# Patient Record
Sex: FEMALE | Race: WHITE
[De-identification: names, ages, dates, MRNs, and addresses within clinical notes are randomized per-mention and may not be internally consistent; named-entity substitution may affect disease eponyms.]

---

## 2020-01-16 ENCOUNTER — HOSPITAL ENCOUNTER (OUTPATIENT)
Dept: HOSPITAL 4 - SUS | Age: 65
Discharge: HOME | End: 2020-01-16
Attending: SPECIALIST
Payer: COMMERCIAL

## 2020-01-16 DIAGNOSIS — E04.2: Primary | ICD-10-CM

## 2020-11-05 ENCOUNTER — HOSPITAL ENCOUNTER (OUTPATIENT)
Dept: HOSPITAL 26 - MLB | Age: 65
Discharge: HOME | End: 2020-11-05
Payer: COMMERCIAL

## 2020-11-05 DIAGNOSIS — I10: ICD-10-CM

## 2020-11-05 DIAGNOSIS — E66.01: ICD-10-CM

## 2020-11-05 DIAGNOSIS — E11.9: Primary | ICD-10-CM

## 2020-11-05 DIAGNOSIS — D50.0: ICD-10-CM

## 2020-11-05 DIAGNOSIS — E78.2: ICD-10-CM

## 2020-11-05 LAB
ALBUMIN FLD-MCNC: 3.4 G/DL (ref 3.4–5)
ANION GAP SERPL CALCULATED.3IONS-SCNC: 16.5 MMOL/L (ref 8–16)
AST SERPL-CCNC: 19 U/L (ref 15–37)
BASOPHILS # BLD AUTO: 0.1 K/UL (ref 0–0.22)
BASOPHILS NFR BLD AUTO: 0.7 % (ref 0–2)
BILIRUB SERPL-MCNC: 0.4 MG/DL (ref 0–1)
BUN SERPL-MCNC: 18 MG/DL (ref 7–18)
CHLORIDE SERPL-SCNC: 103 MMOL/L (ref 98–107)
CHOLEST/HDLC SERPL: 2.8 {RATIO} (ref 1–4.5)
CO2 SERPL-SCNC: 26 MMOL/L (ref 21–32)
CREAT SERPL-MCNC: 0.9 MG/DL (ref 0.6–1.3)
EOSINOPHIL # BLD AUTO: 0.3 K/UL (ref 0–0.4)
EOSINOPHIL NFR BLD AUTO: 3.9 % (ref 0–4)
ERYTHROCYTE [DISTWIDTH] IN BLOOD BY AUTOMATED COUNT: 15.8 % (ref 11.6–13.7)
GFR SERPL CREATININE-BSD FRML MDRD: 81 ML/MIN (ref 90–?)
GLUCOSE SERPL-MCNC: 123 MG/DL (ref 74–106)
HCT VFR BLD AUTO: 30.1 % (ref 36–48)
HDLC SERPL-MCNC: 53 MG/DL (ref 40–60)
HGB BLD-MCNC: 9.9 G/DL (ref 12–16)
IRON SERPL-MCNC: 23 UG/DL (ref 50–175)
LDLC SERPL CALC-MCNC: 76 MG/DL (ref 60–100)
LYMPHOCYTES # BLD AUTO: 1.1 K/UL (ref 2.5–16.5)
LYMPHOCYTES NFR BLD AUTO: 14.3 % (ref 20.5–51.1)
MCH RBC QN AUTO: 29 PG (ref 27–31)
MCHC RBC AUTO-ENTMCNC: 33 G/DL (ref 33–37)
MCV RBC AUTO: 86.5 FL (ref 80–94)
MONOCYTES # BLD AUTO: 0.5 K/UL (ref 0.8–1)
MONOCYTES NFR BLD AUTO: 6.7 % (ref 1.7–9.3)
NEUTROPHILS # BLD AUTO: 5.9 K/UL (ref 1.8–7.7)
NEUTROPHILS NFR BLD AUTO: 74.4 % (ref 42.2–75.2)
PLATELET # BLD AUTO: 354 K/UL (ref 140–450)
POTASSIUM SERPL-SCNC: 4.5 MMOL/L (ref 3.5–5.1)
RBC # BLD AUTO: 3.48 MIL/UL (ref 4.2–5.4)
SODIUM SERPL-SCNC: 141 MMOL/L (ref 136–145)
TIBC SERPL-MCNC: 442 UG/DL (ref 250–450)
TRIGL SERPL-MCNC: 97 MG/DL (ref 30–150)
TSH SERPL DL<=0.05 MIU/L-ACNC: 1.69 UIU/ML (ref 0.34–3.74)
WBC # BLD AUTO: 7.9 K/UL (ref 4.8–10.8)

## 2020-11-06 LAB — T4 FREE SERPL-MCNC: 0.99 NG/DL (ref 0.82–1.77)

## 2020-12-03 ENCOUNTER — HOSPITAL ENCOUNTER (EMERGENCY)
Dept: HOSPITAL 26 - MED | Age: 65
Discharge: HOME | End: 2020-12-03
Payer: COMMERCIAL

## 2020-12-03 VITALS — SYSTOLIC BLOOD PRESSURE: 127 MMHG | DIASTOLIC BLOOD PRESSURE: 78 MMHG

## 2020-12-03 VITALS — BODY MASS INDEX: 36.44 KG/M2 | HEIGHT: 62 IN | WEIGHT: 198 LBS

## 2020-12-03 VITALS — SYSTOLIC BLOOD PRESSURE: 158 MMHG | DIASTOLIC BLOOD PRESSURE: 93 MMHG

## 2020-12-03 DIAGNOSIS — Z79.899: ICD-10-CM

## 2020-12-03 DIAGNOSIS — I51.89: ICD-10-CM

## 2020-12-03 DIAGNOSIS — Z98.890: ICD-10-CM

## 2020-12-03 DIAGNOSIS — I10: ICD-10-CM

## 2020-12-03 DIAGNOSIS — Z88.0: ICD-10-CM

## 2020-12-03 DIAGNOSIS — E07.9: ICD-10-CM

## 2020-12-03 DIAGNOSIS — J44.9: ICD-10-CM

## 2020-12-03 DIAGNOSIS — E11.9: ICD-10-CM

## 2020-12-03 DIAGNOSIS — N23: Primary | ICD-10-CM

## 2020-12-03 DIAGNOSIS — J45.909: ICD-10-CM

## 2020-12-03 LAB
ALBUMIN FLD-MCNC: 3.9 G/DL (ref 3.4–5)
ANION GAP SERPL CALCULATED.3IONS-SCNC: 14.4 MMOL/L (ref 8–16)
APPEARANCE UR: (no result)
AST SERPL-CCNC: 25 U/L (ref 15–37)
BASOPHILS # BLD AUTO: 0.1 K/UL (ref 0–0.22)
BASOPHILS NFR BLD AUTO: 1.3 % (ref 0–2)
BILIRUB SERPL-MCNC: 0.5 MG/DL (ref 0–1)
BILIRUB UR QL STRIP: NEGATIVE
BUN SERPL-MCNC: 19 MG/DL (ref 7–18)
CHLORIDE SERPL-SCNC: 104 MMOL/L (ref 98–107)
CO2 SERPL-SCNC: 28 MMOL/L (ref 21–32)
COLOR UR: YELLOW
CREAT SERPL-MCNC: 1.2 MG/DL (ref 0.6–1.3)
EOSINOPHIL # BLD AUTO: 0.3 K/UL (ref 0–0.4)
EOSINOPHIL NFR BLD AUTO: 3.1 % (ref 0–4)
ERYTHROCYTE [DISTWIDTH] IN BLOOD BY AUTOMATED COUNT: 15.5 % (ref 11.6–13.7)
GFR SERPL CREATININE-BSD FRML MDRD: 58 ML/MIN (ref 90–?)
GLUCOSE SERPL-MCNC: 115 MG/DL (ref 74–106)
GLUCOSE UR STRIP-MCNC: NEGATIVE MG/DL
HCT VFR BLD AUTO: 35.5 % (ref 36–48)
HGB BLD-MCNC: 11.3 G/DL (ref 12–16)
HGB UR QL STRIP: (no result)
LEUKOCYTE ESTERASE UR QL STRIP: (no result)
LIPASE SERPL-CCNC: 234 U/L (ref 73–393)
LYMPHOCYTES # BLD AUTO: 1.7 K/UL (ref 2.5–16.5)
LYMPHOCYTES NFR BLD AUTO: 15.9 % (ref 20.5–51.1)
MCH RBC QN AUTO: 28 PG (ref 27–31)
MCHC RBC AUTO-ENTMCNC: 32 G/DL (ref 33–37)
MCV RBC AUTO: 88.5 FL (ref 80–94)
MONOCYTES # BLD AUTO: 0.7 K/UL (ref 0.8–1)
MONOCYTES NFR BLD AUTO: 6.7 % (ref 1.7–9.3)
NEUTROPHILS # BLD AUTO: 8 K/UL (ref 1.8–7.7)
NEUTROPHILS NFR BLD AUTO: 73 % (ref 42.2–75.2)
NITRITE UR QL STRIP: NEGATIVE
PH UR STRIP: 5.5 [PH] (ref 5–9)
PLATELET # BLD AUTO: 394 K/UL (ref 140–450)
POTASSIUM SERPL-SCNC: 4.4 MMOL/L (ref 3.5–5.1)
RBC # BLD AUTO: 4.02 MIL/UL (ref 4.2–5.4)
RBC #/AREA URNS HPF: (no result) /HPF (ref 0–5)
SODIUM SERPL-SCNC: 142 MMOL/L (ref 136–145)
WBC # BLD AUTO: 10.9 K/UL (ref 4.8–10.8)
WBC,URINE: (no result) /HPF (ref 0–5)

## 2020-12-03 PROCEDURE — 85025 COMPLETE CBC W/AUTO DIFF WBC: CPT

## 2020-12-03 PROCEDURE — 83690 ASSAY OF LIPASE: CPT

## 2020-12-03 PROCEDURE — 96376 TX/PRO/DX INJ SAME DRUG ADON: CPT

## 2020-12-03 PROCEDURE — 99284 EMERGENCY DEPT VISIT MOD MDM: CPT

## 2020-12-03 PROCEDURE — 81001 URINALYSIS AUTO W/SCOPE: CPT

## 2020-12-03 PROCEDURE — 80053 COMPREHEN METABOLIC PANEL: CPT

## 2020-12-03 PROCEDURE — 74176 CT ABD & PELVIS W/O CONTRAST: CPT

## 2020-12-03 PROCEDURE — 87086 URINE CULTURE/COLONY COUNT: CPT

## 2020-12-03 PROCEDURE — 96361 HYDRATE IV INFUSION ADD-ON: CPT

## 2020-12-03 PROCEDURE — 96375 TX/PRO/DX INJ NEW DRUG ADDON: CPT

## 2020-12-03 PROCEDURE — 36415 COLL VENOUS BLD VENIPUNCTURE: CPT

## 2020-12-03 PROCEDURE — 96365 THER/PROPH/DIAG IV INF INIT: CPT

## 2020-12-03 NOTE — NUR
Pt c/o left flank pain that radiates to left lower abd and vaginal pressure 
since 0900 today. 



medhx: HTN, DM, hypothyroid, hiatal hernia, kidney stones

## 2020-12-03 NOTE — NUR
Patient discharged with v/s stable. Written and verbal after care instructions 
given and explained. 

Patient alert, oriented and verbalized understanding of instructions. 
Ambulatory with steady gait. All questions addressed prior to discharge. ID 
band removed. Patient advised to follow up with PMD. Rx of phenazopyridine 
hydrochloride 100mg tab TID PO, naprosyn 500mg tab BID PO PRN pain, 
ciprofloxacin hydrochloride 500mg tab BID PO, and zofran ODT 1 tab q8h PRN 
nausea given. Patient educated on indication of medication including possible 
reaction and side effects. Opportunity to ask questions provided and answered.

## 2021-03-05 ENCOUNTER — HOSPITAL ENCOUNTER (OUTPATIENT)
Dept: HOSPITAL 26 - MLB | Age: 66
Discharge: HOME | End: 2021-03-05
Attending: FAMILY MEDICINE
Payer: COMMERCIAL

## 2021-03-05 DIAGNOSIS — E78.2: ICD-10-CM

## 2021-03-05 DIAGNOSIS — E66.01: ICD-10-CM

## 2021-03-05 DIAGNOSIS — I10: Primary | ICD-10-CM

## 2021-03-05 DIAGNOSIS — E11.9: ICD-10-CM

## 2021-03-05 DIAGNOSIS — D50.0: ICD-10-CM

## 2021-03-05 LAB
ALBUMIN FLD-MCNC: 3.4 G/DL (ref 3.4–5)
ANION GAP SERPL CALCULATED.3IONS-SCNC: 16.1 MMOL/L (ref 8–16)
AST SERPL-CCNC: 17 U/L (ref 15–37)
BASOPHILS # BLD AUTO: 0.1 K/UL (ref 0–0.22)
BASOPHILS NFR BLD AUTO: 0.9 % (ref 0–2)
BILIRUB SERPL-MCNC: 0.4 MG/DL (ref 0–1)
BUN SERPL-MCNC: 15 MG/DL (ref 7–18)
CHLORIDE SERPL-SCNC: 104 MMOL/L (ref 98–107)
CHOLEST/HDLC SERPL: 2.9 {RATIO} (ref 1–4.5)
CO2 SERPL-SCNC: 24.4 MMOL/L (ref 21–32)
CREAT SERPL-MCNC: 0.8 MG/DL (ref 0.6–1.3)
EOSINOPHIL # BLD AUTO: 0.9 K/UL (ref 0–0.4)
EOSINOPHIL NFR BLD AUTO: 11.3 % (ref 0–4)
ERYTHROCYTE [DISTWIDTH] IN BLOOD BY AUTOMATED COUNT: 14.8 % (ref 11.6–13.7)
GFR SERPL CREATININE-BSD FRML MDRD: 93 ML/MIN (ref 90–?)
GLUCOSE SERPL-MCNC: 123 MG/DL (ref 74–106)
HCT VFR BLD AUTO: 32.8 % (ref 36–48)
HDLC SERPL-MCNC: 46 MG/DL (ref 40–60)
HGB BLD-MCNC: 10.7 G/DL (ref 12–16)
IRON SERPL-MCNC: 254 UG/DL (ref 50–175)
LDLC SERPL CALC-MCNC: 56 MG/DL (ref 60–100)
LYMPHOCYTES # BLD AUTO: 1.3 K/UL (ref 2.5–16.5)
LYMPHOCYTES NFR BLD AUTO: 17.8 % (ref 20.5–51.1)
MCH RBC QN AUTO: 28 PG (ref 27–31)
MCHC RBC AUTO-ENTMCNC: 33 G/DL (ref 33–37)
MCV RBC AUTO: 86.8 FL (ref 80–94)
MONOCYTES # BLD AUTO: 0.6 K/UL (ref 0.8–1)
MONOCYTES NFR BLD AUTO: 7.7 % (ref 1.7–9.3)
NEUTROPHILS # BLD AUTO: 4.7 K/UL (ref 1.8–7.7)
NEUTROPHILS NFR BLD AUTO: 62.3 % (ref 42.2–75.2)
PLATELET # BLD AUTO: 335 K/UL (ref 140–450)
POTASSIUM SERPL-SCNC: 3.5 MMOL/L (ref 3.5–5.1)
RBC # BLD AUTO: 3.78 MIL/UL (ref 4.2–5.4)
SODIUM SERPL-SCNC: 141 MMOL/L (ref 136–145)
TRIGL SERPL-MCNC: 151 MG/DL (ref 30–150)
TSH SERPL DL<=0.05 MIU/L-ACNC: 0.05 UIU/ML (ref 0.34–3.74)
WBC # BLD AUTO: 7.5 K/UL (ref 4.8–10.8)

## 2021-03-06 LAB — T4 FREE SERPL-MCNC: 1.28 NG/DL (ref 0.82–1.77)

## 2021-05-04 ENCOUNTER — HOSPITAL ENCOUNTER (OUTPATIENT)
Dept: HOSPITAL 26 - MUS | Age: 66
Discharge: HOME | End: 2021-05-04
Attending: INTERNAL MEDICINE
Payer: COMMERCIAL

## 2021-05-04 DIAGNOSIS — K76.0: ICD-10-CM

## 2021-05-04 DIAGNOSIS — N20.0: Primary | ICD-10-CM

## 2021-06-28 ENCOUNTER — HOSPITAL ENCOUNTER (OUTPATIENT)
Dept: HOSPITAL 26 - MLB | Age: 66
Discharge: HOME | End: 2021-06-28
Payer: COMMERCIAL

## 2021-06-28 DIAGNOSIS — I10: ICD-10-CM

## 2021-06-28 DIAGNOSIS — E11.9: Primary | ICD-10-CM

## 2021-06-28 DIAGNOSIS — E78.2: ICD-10-CM

## 2021-06-28 DIAGNOSIS — E66.01: ICD-10-CM

## 2021-06-28 DIAGNOSIS — E05.20: ICD-10-CM

## 2021-06-28 LAB
ALBUMIN FLD-MCNC: 3.7 G/DL (ref 3.4–5)
ANION GAP SERPL CALCULATED.3IONS-SCNC: 9.7 MMOL/L (ref 8–16)
AST SERPL-CCNC: 25 U/L (ref 15–37)
BASOPHILS # BLD AUTO: 0.1 K/UL (ref 0–0.22)
BASOPHILS NFR BLD AUTO: 0.7 % (ref 0–2)
BILIRUB SERPL-MCNC: 0.5 MG/DL (ref 0–1)
BUN SERPL-MCNC: 17 MG/DL (ref 7–18)
CHLORIDE SERPL-SCNC: 104 MMOL/L (ref 98–107)
CHOLEST/HDLC SERPL: 3.6 {RATIO} (ref 1–4.5)
CO2 SERPL-SCNC: 29.7 MMOL/L (ref 21–32)
CREAT SERPL-MCNC: 0.8 MG/DL (ref 0.6–1.3)
EOSINOPHIL # BLD AUTO: 0.6 K/UL (ref 0–0.4)
EOSINOPHIL NFR BLD AUTO: 7.6 % (ref 0–4)
ERYTHROCYTE [DISTWIDTH] IN BLOOD BY AUTOMATED COUNT: 14.6 % (ref 11.6–13.7)
GFR SERPL CREATININE-BSD FRML MDRD: 92 ML/MIN (ref 90–?)
GLUCOSE SERPL-MCNC: 128 MG/DL (ref 74–106)
HCT VFR BLD AUTO: 36.1 % (ref 36–48)
HDLC SERPL-MCNC: 54 MG/DL (ref 40–60)
HGB BLD-MCNC: 11.8 G/DL (ref 12–16)
LDLC SERPL CALC-MCNC: 107 MG/DL (ref 60–100)
LYMPHOCYTES # BLD AUTO: 1.3 K/UL (ref 2.5–16.5)
LYMPHOCYTES NFR BLD AUTO: 16.2 % (ref 20.5–51.1)
MCH RBC QN AUTO: 29 PG (ref 27–31)
MCHC RBC AUTO-ENTMCNC: 33 G/DL (ref 33–37)
MCV RBC AUTO: 88.6 FL (ref 80–94)
MONOCYTES # BLD AUTO: 0.5 K/UL (ref 0.8–1)
MONOCYTES NFR BLD AUTO: 6.6 % (ref 1.7–9.3)
NEUTROPHILS # BLD AUTO: 5.4 K/UL (ref 1.8–7.7)
NEUTROPHILS NFR BLD AUTO: 68.9 % (ref 42.2–75.2)
PLATELET # BLD AUTO: 366 K/UL (ref 140–450)
POTASSIUM SERPL-SCNC: 4.4 MMOL/L (ref 3.5–5.1)
RBC # BLD AUTO: 4.08 MIL/UL (ref 4.2–5.4)
SODIUM SERPL-SCNC: 139 MMOL/L (ref 136–145)
TRIGL SERPL-MCNC: 170 MG/DL (ref 30–150)
TSH SERPL DL<=0.05 MIU/L-ACNC: 0.49 UIU/ML (ref 0.34–3.74)
WBC # BLD AUTO: 7.9 K/UL (ref 4.8–10.8)

## 2021-06-29 LAB — T4 FREE SERPL-MCNC: 1.06 NG/DL (ref 0.82–1.77)

## 2021-11-03 ENCOUNTER — HOSPITAL ENCOUNTER (OUTPATIENT)
Dept: HOSPITAL 26 - MLB | Age: 66
Discharge: HOME | End: 2021-11-03
Payer: COMMERCIAL

## 2021-11-03 DIAGNOSIS — E78.2: ICD-10-CM

## 2021-11-03 DIAGNOSIS — E11.69: Primary | ICD-10-CM

## 2021-11-03 DIAGNOSIS — I10: ICD-10-CM

## 2021-11-03 LAB
ALBUMIN FLD-MCNC: 3.4 G/DL (ref 3.4–5)
ANION GAP SERPL CALCULATED.3IONS-SCNC: 12.4 MMOL/L (ref 8–16)
AST SERPL-CCNC: 21 U/L (ref 15–37)
BASOPHILS # BLD AUTO: 0 K/UL (ref 0–0.22)
BASOPHILS NFR BLD AUTO: 0.6 % (ref 0–2)
BILIRUB SERPL-MCNC: 0.3 MG/DL (ref 0–1)
BUN SERPL-MCNC: 17 MG/DL (ref 7–18)
CHLORIDE SERPL-SCNC: 106 MMOL/L (ref 98–107)
CHOLEST/HDLC SERPL: 3.9 {RATIO} (ref 1–4.5)
CO2 SERPL-SCNC: 26.3 MMOL/L (ref 21–32)
CREAT SERPL-MCNC: 0.8 MG/DL (ref 0.6–1.3)
EOSINOPHIL # BLD AUTO: 0.3 K/UL (ref 0–0.4)
EOSINOPHIL NFR BLD AUTO: 4 % (ref 0–4)
ERYTHROCYTE [DISTWIDTH] IN BLOOD BY AUTOMATED COUNT: 15.7 % (ref 11.6–13.7)
GFR SERPL CREATININE-BSD FRML MDRD: 92 ML/MIN (ref 90–?)
GLUCOSE SERPL-MCNC: 109 MG/DL (ref 74–106)
HCT VFR BLD AUTO: 30.7 % (ref 36–48)
HDLC SERPL-MCNC: 52 MG/DL (ref 40–60)
HGB BLD-MCNC: 10.3 G/DL (ref 12–16)
LDLC SERPL CALC-MCNC: 120 MG/DL (ref 60–100)
LYMPHOCYTES # BLD AUTO: 1.1 K/UL (ref 2.5–16.5)
LYMPHOCYTES NFR BLD AUTO: 15.3 % (ref 20.5–51.1)
MCH RBC QN AUTO: 30 PG (ref 27–31)
MCHC RBC AUTO-ENTMCNC: 34 G/DL (ref 33–37)
MCV RBC AUTO: 88.2 FL (ref 80–94)
MONOCYTES # BLD AUTO: 0.5 K/UL (ref 0.8–1)
MONOCYTES NFR BLD AUTO: 7.1 % (ref 1.7–9.3)
NEUTROPHILS # BLD AUTO: 5.2 K/UL (ref 1.8–7.7)
NEUTROPHILS NFR BLD AUTO: 73 % (ref 42.2–75.2)
PLATELET # BLD AUTO: 362 K/UL (ref 140–450)
POTASSIUM SERPL-SCNC: 3.7 MMOL/L (ref 3.5–5.1)
RBC # BLD AUTO: 3.48 MIL/UL (ref 4.2–5.4)
SODIUM SERPL-SCNC: 141 MMOL/L (ref 136–145)
T4 FREE SERPL-MCNC: 0.84 NG/DL (ref 0.76–1.46)
TRIGL SERPL-MCNC: 159 MG/DL (ref 30–150)
TSH SERPL DL<=0.05 MIU/L-ACNC: 0.17 UIU/ML (ref 0.34–3.74)
WBC # BLD AUTO: 7.1 K/UL (ref 4.8–10.8)

## 2022-01-31 ENCOUNTER — HOSPITAL ENCOUNTER (OUTPATIENT)
Dept: HOSPITAL 26 - MLB | Age: 67
Discharge: HOME | End: 2022-01-31
Attending: FAMILY MEDICINE
Payer: COMMERCIAL

## 2022-01-31 DIAGNOSIS — E11.9: Primary | ICD-10-CM

## 2022-01-31 DIAGNOSIS — E05.90: ICD-10-CM

## 2022-01-31 DIAGNOSIS — E53.9: ICD-10-CM

## 2022-01-31 DIAGNOSIS — D50.9: ICD-10-CM

## 2022-01-31 LAB
BASOPHILS # BLD AUTO: 0.1 K/UL (ref 0–0.22)
BASOPHILS NFR BLD AUTO: 0.6 % (ref 0–2)
EOSINOPHIL # BLD AUTO: 0.2 K/UL (ref 0–0.4)
EOSINOPHIL NFR BLD AUTO: 2 % (ref 0–4)
ERYTHROCYTE [DISTWIDTH] IN BLOOD BY AUTOMATED COUNT: 16.7 % (ref 11.6–13.7)
HCT VFR BLD AUTO: 31.7 % (ref 36–48)
HGB BLD-MCNC: 10.3 G/DL (ref 12–16)
LYMPHOCYTES # BLD AUTO: 1.8 K/UL (ref 2.5–16.5)
LYMPHOCYTES NFR BLD AUTO: 18.1 % (ref 20.5–51.1)
MCH RBC QN AUTO: 27 PG (ref 27–31)
MCHC RBC AUTO-ENTMCNC: 32 G/DL (ref 33–37)
MCV RBC AUTO: 82.5 FL (ref 80–94)
MONOCYTES # BLD AUTO: 0.9 K/UL (ref 0.8–1)
MONOCYTES NFR BLD AUTO: 9.4 % (ref 1.7–9.3)
NEUTROPHILS # BLD AUTO: 6.9 K/UL (ref 1.8–7.7)
NEUTROPHILS NFR BLD AUTO: 69.9 % (ref 42.2–75.2)
PLATELET # BLD AUTO: 430 K/UL (ref 140–450)
RBC # BLD AUTO: 3.85 MIL/UL (ref 4.2–5.4)
T4 FREE SERPL-MCNC: 1.09 NG/DL (ref 0.76–1.46)
TSH SERPL DL<=0.05 MIU/L-ACNC: 0.04 UIU/ML (ref 0.34–3.74)
WBC # BLD AUTO: 9.9 K/UL (ref 4.8–10.8)

## 2022-02-02 LAB
FOLATE SERPL-MCNC: > 20 NG/ML (ref 3–?)
VIT B12 SERPL-MCNC: 396 PG/ML (ref 232–1245)

## 2022-03-01 ENCOUNTER — HOSPITAL ENCOUNTER (OUTPATIENT)
Dept: HOSPITAL 26 - MLB | Age: 67
Discharge: HOME | End: 2022-03-01
Attending: FAMILY MEDICINE
Payer: COMMERCIAL

## 2022-03-01 DIAGNOSIS — E78.2: ICD-10-CM

## 2022-03-01 DIAGNOSIS — E11.69: ICD-10-CM

## 2022-03-01 DIAGNOSIS — I10: Primary | ICD-10-CM

## 2022-03-01 LAB
ALBUMIN FLD-MCNC: 3.4 G/DL (ref 3.4–5)
ANION GAP SERPL CALCULATED.3IONS-SCNC: 12.3 MMOL/L (ref 8–16)
AST SERPL-CCNC: 21 U/L (ref 15–37)
BASOPHILS # BLD AUTO: 0 K/UL (ref 0–0.22)
BASOPHILS NFR BLD AUTO: 0.4 % (ref 0–2)
BILIRUB SERPL-MCNC: 0.5 MG/DL (ref 0–1)
BUN SERPL-MCNC: 11 MG/DL (ref 7–18)
CHLORIDE SERPL-SCNC: 105 MMOL/L (ref 98–107)
CHOLEST/HDLC SERPL: 4.3 {RATIO} (ref 1–4.5)
CO2 SERPL-SCNC: 27.3 MMOL/L (ref 21–32)
CREAT SERPL-MCNC: 0.6 MG/DL (ref 0.6–1.3)
EOSINOPHIL # BLD AUTO: 0.2 K/UL (ref 0–0.4)
EOSINOPHIL NFR BLD AUTO: 2.4 % (ref 0–4)
ERYTHROCYTE [DISTWIDTH] IN BLOOD BY AUTOMATED COUNT: 16.3 % (ref 11.6–13.7)
GFR SERPL CREATININE-BSD FRML MDRD: 129 ML/MIN (ref 90–?)
GLUCOSE SERPL-MCNC: 122 MG/DL (ref 74–106)
HCT VFR BLD AUTO: 27.5 % (ref 36–48)
HDLC SERPL-MCNC: 45 MG/DL (ref 40–60)
HGB BLD-MCNC: 8.8 G/DL (ref 12–16)
LDLC SERPL CALC-MCNC: 127 MG/DL (ref 60–100)
LYMPHOCYTES # BLD AUTO: 1 K/UL (ref 2.5–16.5)
LYMPHOCYTES NFR BLD AUTO: 16.5 % (ref 20.5–51.1)
MCH RBC QN AUTO: 25 PG (ref 27–31)
MCHC RBC AUTO-ENTMCNC: 32 G/DL (ref 33–37)
MCV RBC AUTO: 79.4 FL (ref 80–94)
MONOCYTES # BLD AUTO: 0.5 K/UL (ref 0.8–1)
MONOCYTES NFR BLD AUTO: 8.5 % (ref 1.7–9.3)
NEUTROPHILS # BLD AUTO: 4.5 K/UL (ref 1.8–7.7)
NEUTROPHILS NFR BLD AUTO: 72.2 % (ref 42.2–75.2)
PLATELET # BLD AUTO: 417 K/UL (ref 140–450)
POTASSIUM SERPL-SCNC: 3.6 MMOL/L (ref 3.5–5.1)
RBC # BLD AUTO: 3.46 MIL/UL (ref 4.2–5.4)
SODIUM SERPL-SCNC: 141 MMOL/L (ref 136–145)
TRIGL SERPL-MCNC: 114 MG/DL (ref 30–150)
TSH SERPL DL<=0.05 MIU/L-ACNC: 0.01 UIU/ML (ref 0.34–3.74)
WBC # BLD AUTO: 6.3 K/UL (ref 4.8–10.8)

## 2022-03-02 LAB
MICROALBUMIN UR-MCNC: 124.6 UG/ML
T4 FREE SERPL-MCNC: 1.33 NG/DL (ref 0.82–1.77)

## 2022-03-16 ENCOUNTER — HOSPITAL ENCOUNTER (OUTPATIENT)
Dept: HOSPITAL 26 - MLB | Age: 67
Discharge: HOME | End: 2022-03-16
Attending: FAMILY MEDICINE
Payer: COMMERCIAL

## 2022-03-16 DIAGNOSIS — D64.9: Primary | ICD-10-CM

## 2022-03-16 LAB
BASOPHILS # BLD AUTO: 0.1 K/UL (ref 0–0.22)
BASOPHILS NFR BLD AUTO: 0.9 % (ref 0–2)
EOSINOPHIL # BLD AUTO: 0.3 K/UL (ref 0–0.4)
EOSINOPHIL NFR BLD AUTO: 3.7 % (ref 0–4)
ERYTHROCYTE [DISTWIDTH] IN BLOOD BY AUTOMATED COUNT: 16 % (ref 11.6–13.7)
HCT VFR BLD AUTO: 31.3 % (ref 36–48)
HGB BLD-MCNC: 9.9 G/DL (ref 12–16)
LYMPHOCYTES # BLD AUTO: 1.2 K/UL (ref 2.5–16.5)
LYMPHOCYTES NFR BLD AUTO: 15.6 % (ref 20.5–51.1)
MCH RBC QN AUTO: 25 PG (ref 27–31)
MCHC RBC AUTO-ENTMCNC: 32 G/DL (ref 33–37)
MCV RBC AUTO: 77.9 FL (ref 80–94)
MONOCYTES # BLD AUTO: 0.5 K/UL (ref 0.8–1)
MONOCYTES NFR BLD AUTO: 6.7 % (ref 1.7–9.3)
NEUTROPHILS # BLD AUTO: 5.6 K/UL (ref 1.8–7.7)
NEUTROPHILS NFR BLD AUTO: 73.1 % (ref 42.2–75.2)
PLATELET # BLD AUTO: 396 K/UL (ref 140–450)
RBC # BLD AUTO: 4.03 MIL/UL (ref 4.2–5.4)
WBC # BLD AUTO: 7.6 K/UL (ref 4.8–10.8)

## 2022-03-17 LAB
FERRITIN SERPL-MCNC: 11 NG/ML (ref 15–150)
FOLATE SERPL-MCNC: > 20 NG/ML (ref 3–?)
IRON SERPL-MCNC: 242 UG/DL (ref 50–175)
TIBC SERPL-MCNC: 459 UG/DL (ref 250–450)
VIT B12 SERPL-MCNC: 368 PG/ML (ref 232–1245)

## 2022-04-07 ENCOUNTER — HOSPITAL ENCOUNTER (OUTPATIENT)
Dept: HOSPITAL 26 - MLB | Age: 67
Discharge: HOME | End: 2022-04-07
Payer: COMMERCIAL

## 2022-04-07 DIAGNOSIS — D50.9: ICD-10-CM

## 2022-04-07 DIAGNOSIS — E11.69: ICD-10-CM

## 2022-04-07 DIAGNOSIS — E05.20: ICD-10-CM

## 2022-04-07 DIAGNOSIS — I10: Primary | ICD-10-CM

## 2022-04-07 LAB
ALBUMIN FLD-MCNC: 3.4 G/DL (ref 3.4–5)
ANION GAP SERPL CALCULATED.3IONS-SCNC: 10.4 MMOL/L (ref 8–16)
AST SERPL-CCNC: 18 U/L (ref 15–37)
BASOPHILS # BLD AUTO: 0 K/UL (ref 0–0.22)
BASOPHILS NFR BLD AUTO: 0.6 % (ref 0–2)
BILIRUB SERPL-MCNC: 0.5 MG/DL (ref 0–1)
BUN SERPL-MCNC: 12 MG/DL (ref 7–18)
CHLORIDE SERPL-SCNC: 106 MMOL/L (ref 98–107)
CHOLEST/HDLC SERPL: 2.5 {RATIO} (ref 1–4.5)
CO2 SERPL-SCNC: 28.2 MMOL/L (ref 21–32)
CREAT SERPL-MCNC: 0.6 MG/DL (ref 0.6–1.3)
EOSINOPHIL # BLD AUTO: 0.2 K/UL (ref 0–0.4)
EOSINOPHIL NFR BLD AUTO: 3.9 % (ref 0–4)
ERYTHROCYTE [DISTWIDTH] IN BLOOD BY AUTOMATED COUNT: 18.1 % (ref 11.6–13.7)
GFR SERPL CREATININE-BSD FRML MDRD: 129 ML/MIN (ref 90–?)
GLUCOSE SERPL-MCNC: 110 MG/DL (ref 74–106)
HCT VFR BLD AUTO: 30.3 % (ref 36–48)
HDLC SERPL-MCNC: 51 MG/DL (ref 40–60)
HGB BLD-MCNC: 9.8 G/DL (ref 12–16)
IRON SERPL-MCNC: 150 UG/DL (ref 50–175)
LDLC SERPL CALC-MCNC: 58 MG/DL (ref 60–100)
LYMPHOCYTES # BLD AUTO: 1.1 K/UL (ref 2.5–16.5)
LYMPHOCYTES NFR BLD AUTO: 17.4 % (ref 20.5–51.1)
MCH RBC QN AUTO: 26 PG (ref 27–31)
MCHC RBC AUTO-ENTMCNC: 32 G/DL (ref 33–37)
MCV RBC AUTO: 80.8 FL (ref 80–94)
MONOCYTES # BLD AUTO: 0.4 K/UL (ref 0.8–1)
MONOCYTES NFR BLD AUTO: 7 % (ref 1.7–9.3)
NEUTROPHILS # BLD AUTO: 4.5 K/UL (ref 1.8–7.7)
NEUTROPHILS NFR BLD AUTO: 71.1 % (ref 42.2–75.2)
PLATELET # BLD AUTO: 297 K/UL (ref 140–450)
POTASSIUM SERPL-SCNC: 3.6 MMOL/L (ref 3.5–5.1)
RBC # BLD AUTO: 3.76 MIL/UL (ref 4.2–5.4)
SODIUM SERPL-SCNC: 141 MMOL/L (ref 136–145)
T4 FREE SERPL-MCNC: 0.79 NG/DL (ref 0.76–1.46)
TIBC SERPL-MCNC: 407 UG/DL (ref 250–450)
TRIGL SERPL-MCNC: 109 MG/DL (ref 30–150)
TSH SERPL DL<=0.05 MIU/L-ACNC: 1.01 UIU/ML (ref 0.34–3.74)
WBC # BLD AUTO: 6.3 K/UL (ref 4.8–10.8)

## 2022-04-08 LAB
MICROALBUMIN UR-MCNC: 168.4 UG/ML
T3 SERPL-MCNC: 161 NG/DL (ref 71–180)
T3RU NFR SERPL: 21 % (ref 24–39)

## 2022-04-25 ENCOUNTER — HOSPITAL ENCOUNTER (EMERGENCY)
Dept: HOSPITAL 26 - MED | Age: 67
Discharge: HOME | End: 2022-04-25
Payer: COMMERCIAL

## 2022-04-25 VITALS — BODY MASS INDEX: 34.96 KG/M2 | HEIGHT: 62 IN | WEIGHT: 190 LBS

## 2022-04-25 VITALS — SYSTOLIC BLOOD PRESSURE: 118 MMHG | DIASTOLIC BLOOD PRESSURE: 74 MMHG

## 2022-04-25 VITALS — SYSTOLIC BLOOD PRESSURE: 147 MMHG | DIASTOLIC BLOOD PRESSURE: 55 MMHG

## 2022-04-25 DIAGNOSIS — Z79.899: ICD-10-CM

## 2022-04-25 DIAGNOSIS — N20.0: ICD-10-CM

## 2022-04-25 DIAGNOSIS — Z86.39: ICD-10-CM

## 2022-04-25 DIAGNOSIS — I10: ICD-10-CM

## 2022-04-25 DIAGNOSIS — E11.9: ICD-10-CM

## 2022-04-25 DIAGNOSIS — N39.0: Primary | ICD-10-CM

## 2022-04-25 DIAGNOSIS — Z79.1: ICD-10-CM

## 2022-04-25 DIAGNOSIS — Z88.0: ICD-10-CM

## 2022-04-25 DIAGNOSIS — E78.6: ICD-10-CM

## 2022-04-25 LAB
ALBUMIN FLD-MCNC: 3.3 G/DL (ref 3.4–5)
ANION GAP SERPL CALCULATED.3IONS-SCNC: 11.5 MMOL/L (ref 8–16)
APPEARANCE UR: (no result)
AST SERPL-CCNC: 27 U/L (ref 15–37)
BASOPHILS # BLD AUTO: 0 K/UL (ref 0–0.22)
BASOPHILS NFR BLD AUTO: 0.4 % (ref 0–2)
BILIRUB SERPL-MCNC: 0.5 MG/DL (ref 0–1)
BILIRUB UR QL STRIP: NEGATIVE
BUN SERPL-MCNC: 9 MG/DL (ref 7–18)
CHLORIDE SERPL-SCNC: 106 MMOL/L (ref 98–107)
CO2 SERPL-SCNC: 26.5 MMOL/L (ref 21–32)
COLOR UR: (no result)
CREAT SERPL-MCNC: 0.7 MG/DL (ref 0.6–1.3)
EOSINOPHIL # BLD AUTO: 0.2 K/UL (ref 0–0.4)
EOSINOPHIL NFR BLD AUTO: 2.7 % (ref 0–4)
ERYTHROCYTE [DISTWIDTH] IN BLOOD BY AUTOMATED COUNT: 18.5 % (ref 11.6–13.7)
GFR SERPL CREATININE-BSD FRML MDRD: 108 ML/MIN (ref 90–?)
GLUCOSE SERPL-MCNC: 99 MG/DL (ref 74–106)
GLUCOSE UR STRIP-MCNC: NEGATIVE MG/DL
HCT VFR BLD AUTO: 31.8 % (ref 36–48)
HGB BLD-MCNC: 9.9 G/DL (ref 12–16)
HGB UR QL STRIP: (no result)
LEUKOCYTE ESTERASE UR QL STRIP: (no result)
LIPASE SERPL-CCNC: 141 U/L (ref 73–393)
LYMPHOCYTES # BLD AUTO: 1.3 K/UL (ref 2.5–16.5)
LYMPHOCYTES NFR BLD AUTO: 15.3 % (ref 20.5–51.1)
MCH RBC QN AUTO: 26 PG (ref 27–31)
MCHC RBC AUTO-ENTMCNC: 31 G/DL (ref 33–37)
MCV RBC AUTO: 82.1 FL (ref 80–94)
MONOCYTES # BLD AUTO: 0.7 K/UL (ref 0.8–1)
MONOCYTES NFR BLD AUTO: 8.3 % (ref 1.7–9.3)
NEUTROPHILS # BLD AUTO: 6 K/UL (ref 1.8–7.7)
NEUTROPHILS NFR BLD AUTO: 73.3 % (ref 42.2–75.2)
NITRITE UR QL STRIP: NEGATIVE
PH UR STRIP: 5 [PH] (ref 5–9)
PLATELET # BLD AUTO: 315 K/UL (ref 140–450)
POTASSIUM SERPL-SCNC: 4 MMOL/L (ref 3.5–5.1)
RBC # BLD AUTO: 3.87 MIL/UL (ref 4.2–5.4)
RBC #/AREA URNS HPF: >100 /HPF (ref 0–5)
SODIUM SERPL-SCNC: 140 MMOL/L (ref 136–145)
WBC # BLD AUTO: 8.2 K/UL (ref 4.8–10.8)
WBC,URINE: (no result) /HPF (ref 0–5)

## 2022-04-25 PROCEDURE — 74176 CT ABD & PELVIS W/O CONTRAST: CPT

## 2022-04-25 PROCEDURE — 99284 EMERGENCY DEPT VISIT MOD MDM: CPT

## 2022-04-25 PROCEDURE — 96361 HYDRATE IV INFUSION ADD-ON: CPT

## 2022-04-25 PROCEDURE — 96374 THER/PROPH/DIAG INJ IV PUSH: CPT

## 2022-04-25 PROCEDURE — 81001 URINALYSIS AUTO W/SCOPE: CPT

## 2022-04-25 PROCEDURE — 80053 COMPREHEN METABOLIC PANEL: CPT

## 2022-04-25 PROCEDURE — 36415 COLL VENOUS BLD VENIPUNCTURE: CPT

## 2022-04-25 PROCEDURE — 83690 ASSAY OF LIPASE: CPT

## 2022-04-25 PROCEDURE — 85025 COMPLETE CBC W/AUTO DIFF WBC: CPT

## 2022-04-25 PROCEDURE — 87086 URINE CULTURE/COLONY COUNT: CPT

## 2022-04-25 NOTE — NUR
66/F BIB SELF WITH C/O LEFT SIDED FLANK PAIN SINCE SATURDAY. STATES HX OF 
KIDNEY STONES AND STATES PAIN FEELS SIMILAR, DENIES DYSURIA/HEMATURIA. REPORTS 
TAKING TYLENOL WITH NO RELIEF. UPON ASSESSMENT PAIN IS FELT MID L BACK LATERAL 
TO SPINE. TENDERNESS NOTED UPON PALPATION. NO PAIN NOTED ON R SIDE. CURRENT 
PAIN 8/10 AND STABBING LIKE PAIN THAT IS CONSTANT. PT A&OX4





MEDHX: DM, HTN, CHOLESTEROL, THYROID

ALLEGIES: PENICILLINS

## 2022-04-25 NOTE — NUR
Patient discharged with v/s stable. Written and verbal after care instructions 
given and explained. 

Patient alert, oriented and verbalized understanding of instructions. 
Ambulatory with steady gait. All questions addressed prior to discharge. ID 
band removed. Patient advised to follow up with PMD. Rx of Norco 3/325, cipro, 
ibuprofen & flomax given. Patient educated on indication of medication 
including possible reaction and side effects. Opportunity to ask questions 
provided and answered.

## 2022-05-10 ENCOUNTER — HOSPITAL ENCOUNTER (OUTPATIENT)
Dept: HOSPITAL 26 - MLB | Age: 67
Discharge: HOME | End: 2022-05-10
Attending: FAMILY MEDICINE
Payer: COMMERCIAL

## 2022-05-10 DIAGNOSIS — N20.0: ICD-10-CM

## 2022-05-10 DIAGNOSIS — N30.00: Primary | ICD-10-CM

## 2022-05-10 LAB
APPEARANCE UR: (no result)
BILIRUB UR QL STRIP: NEGATIVE
COLOR UR: YELLOW
GLUCOSE UR STRIP-MCNC: NEGATIVE MG/DL
HGB UR QL STRIP: (no result)
LEUKOCYTE ESTERASE UR QL STRIP: (no result)
NITRITE UR QL STRIP: NEGATIVE
PH UR STRIP: 6 [PH] (ref 5–9)
RBC #/AREA URNS HPF: (no result) /HPF (ref 0–5)
WBC,URINE: (no result) /HPF (ref 0–5)

## 2022-05-20 ENCOUNTER — HOSPITAL ENCOUNTER (OUTPATIENT)
Dept: HOSPITAL 26 - MLB | Age: 67
Discharge: HOME | End: 2022-05-20
Attending: FAMILY MEDICINE
Payer: COMMERCIAL

## 2022-05-20 DIAGNOSIS — D64.9: Primary | ICD-10-CM

## 2022-05-20 LAB
BASOPHILS # BLD AUTO: 0.1 K/UL (ref 0–0.22)
BASOPHILS NFR BLD AUTO: 0.8 % (ref 0–2)
EOSINOPHIL # BLD AUTO: 0.3 K/UL (ref 0–0.4)
EOSINOPHIL NFR BLD AUTO: 5 % (ref 0–4)
ERYTHROCYTE [DISTWIDTH] IN BLOOD BY AUTOMATED COUNT: 18.3 % (ref 11.6–13.7)
HCT VFR BLD AUTO: 32.4 % (ref 36–48)
HGB BLD-MCNC: 10.5 G/DL (ref 12–16)
IRON SERPL-MCNC: 186 UG/DL (ref 50–175)
LYMPHOCYTES # BLD AUTO: 1.4 K/UL (ref 2.5–16.5)
LYMPHOCYTES NFR BLD AUTO: 19.4 % (ref 20.5–51.1)
MCH RBC QN AUTO: 27 PG (ref 27–31)
MCHC RBC AUTO-ENTMCNC: 32 G/DL (ref 33–37)
MCV RBC AUTO: 83.6 FL (ref 80–94)
MONOCYTES # BLD AUTO: 0.6 K/UL (ref 0.8–1)
MONOCYTES NFR BLD AUTO: 8.4 % (ref 1.7–9.3)
NEUTROPHILS # BLD AUTO: 4.7 K/UL (ref 1.8–7.7)
NEUTROPHILS NFR BLD AUTO: 66.4 % (ref 42.2–75.2)
PLATELET # BLD AUTO: 360 K/UL (ref 140–450)
RBC # BLD AUTO: 3.88 MIL/UL (ref 4.2–5.4)
TIBC SERPL-MCNC: 412 UG/DL (ref 250–450)
WBC # BLD AUTO: 7 K/UL (ref 4.8–10.8)

## 2022-05-21 LAB
FERRITIN SERPL-MCNC: 12 NG/ML (ref 15–150)
FOLATE SERPL-MCNC: > 20 NG/ML (ref 3–?)
VIT B12 SERPL-MCNC: 394 PG/ML (ref 232–1245)

## 2022-06-02 ENCOUNTER — HOSPITAL ENCOUNTER (OUTPATIENT)
Dept: HOSPITAL 26 - MRD | Age: 67
Discharge: HOME | End: 2022-06-02
Payer: COMMERCIAL

## 2022-06-02 DIAGNOSIS — N20.0: Primary | ICD-10-CM

## 2022-06-02 DIAGNOSIS — M41.86: ICD-10-CM

## 2022-06-02 DIAGNOSIS — I70.0: ICD-10-CM

## 2022-06-02 DIAGNOSIS — M47.816: ICD-10-CM

## 2022-06-08 ENCOUNTER — HOSPITAL ENCOUNTER (OUTPATIENT)
Dept: HOSPITAL 26 - MLB | Age: 67
Discharge: HOME | End: 2022-06-08
Payer: COMMERCIAL

## 2022-06-08 DIAGNOSIS — M47.817: ICD-10-CM

## 2022-06-08 DIAGNOSIS — N20.0: Primary | ICD-10-CM

## 2022-06-21 ENCOUNTER — HOSPITAL ENCOUNTER (OUTPATIENT)
Dept: HOSPITAL 26 - MLB | Age: 67
Discharge: HOME | End: 2022-06-21
Payer: COMMERCIAL

## 2022-06-21 DIAGNOSIS — D50.9: ICD-10-CM

## 2022-06-21 DIAGNOSIS — K57.30: ICD-10-CM

## 2022-06-21 DIAGNOSIS — K44.9: Primary | ICD-10-CM

## 2022-06-21 LAB
BASOPHILS # BLD AUTO: 0.1 K/UL (ref 0–0.22)
BASOPHILS NFR BLD AUTO: 0.6 % (ref 0–2)
EOSINOPHIL # BLD AUTO: 0.7 K/UL (ref 0–0.4)
EOSINOPHIL NFR BLD AUTO: 8.3 % (ref 0–4)
ERYTHROCYTE [DISTWIDTH] IN BLOOD BY AUTOMATED COUNT: 15.9 % (ref 11.6–13.7)
HCT VFR BLD AUTO: 36 % (ref 36–48)
HGB BLD-MCNC: 11.6 G/DL (ref 12–16)
IRON SERPL-MCNC: 92 UG/DL (ref 50–175)
LYMPHOCYTES # BLD AUTO: 1.4 K/UL (ref 2.5–16.5)
LYMPHOCYTES NFR BLD AUTO: 15.2 % (ref 20.5–51.1)
MCH RBC QN AUTO: 27 PG (ref 27–31)
MCHC RBC AUTO-ENTMCNC: 32 G/DL (ref 33–37)
MCV RBC AUTO: 84.7 FL (ref 80–94)
MONOCYTES # BLD AUTO: 0.7 K/UL (ref 0.8–1)
MONOCYTES NFR BLD AUTO: 7.5 % (ref 1.7–9.3)
NEUTROPHILS # BLD AUTO: 6.2 K/UL (ref 1.8–7.7)
NEUTROPHILS NFR BLD AUTO: 68.4 % (ref 42.2–75.2)
PLATELET # BLD AUTO: 341 K/UL (ref 140–450)
RBC # BLD AUTO: 4.25 MIL/UL (ref 4.2–5.4)
TIBC SERPL-MCNC: 410 UG/DL (ref 250–450)
WBC # BLD AUTO: 9.1 K/UL (ref 4.8–10.8)